# Patient Record
Sex: FEMALE | Race: WHITE | NOT HISPANIC OR LATINO | Employment: UNEMPLOYED | URBAN - METROPOLITAN AREA
[De-identification: names, ages, dates, MRNs, and addresses within clinical notes are randomized per-mention and may not be internally consistent; named-entity substitution may affect disease eponyms.]

---

## 2021-08-19 ENCOUNTER — APPOINTMENT (OUTPATIENT)
Dept: CT IMAGING | Facility: CLINIC | Age: 41
End: 2021-08-19
Attending: EMERGENCY MEDICINE
Payer: COMMERCIAL

## 2021-08-19 ENCOUNTER — APPOINTMENT (OUTPATIENT)
Dept: ULTRASOUND IMAGING | Facility: CLINIC | Age: 41
End: 2021-08-19
Attending: EMERGENCY MEDICINE
Payer: COMMERCIAL

## 2021-08-19 ENCOUNTER — APPOINTMENT (OUTPATIENT)
Dept: RADIOLOGY | Facility: CLINIC | Age: 41
End: 2021-08-19
Attending: EMERGENCY MEDICINE
Payer: COMMERCIAL

## 2021-08-19 ENCOUNTER — HOSPITAL ENCOUNTER (EMERGENCY)
Facility: CLINIC | Age: 41
Discharge: HOME | End: 2021-08-20
Attending: EMERGENCY MEDICINE | Admitting: EMERGENCY MEDICINE
Payer: COMMERCIAL

## 2021-08-19 DIAGNOSIS — R07.9 CHEST PAIN, UNSPECIFIED TYPE: ICD-10-CM

## 2021-08-19 DIAGNOSIS — R10.11 RUQ ABDOMINAL PAIN: ICD-10-CM

## 2021-08-19 LAB
ALBUMIN SERPL-MCNC: 3.8 G/DL (ref 3.5–5)
ALBUMIN UR-MCNC: 20 MG/DL
ALP SERPL-CCNC: 77 U/L (ref 45–120)
ALT SERPL W P-5'-P-CCNC: 17 U/L (ref 0–45)
ANION GAP SERPL CALCULATED.3IONS-SCNC: 11 MMOL/L (ref 5–18)
APPEARANCE UR: ABNORMAL
AST SERPL W P-5'-P-CCNC: 15 U/L (ref 0–40)
BASOPHILS # BLD AUTO: 0.1 10E3/UL (ref 0–0.2)
BASOPHILS NFR BLD AUTO: 2 %
BILIRUB DIRECT SERPL-MCNC: <0.1 MG/DL
BILIRUB SERPL-MCNC: 0.2 MG/DL (ref 0–1)
BILIRUB UR QL STRIP: NEGATIVE
BUN SERPL-MCNC: 16 MG/DL (ref 8–22)
CALCIUM SERPL-MCNC: 9.2 MG/DL (ref 8.5–10.5)
CHLORIDE BLD-SCNC: 107 MMOL/L (ref 98–107)
CO2 SERPL-SCNC: 23 MMOL/L (ref 22–31)
COLOR UR AUTO: YELLOW
CREAT SERPL-MCNC: 1.06 MG/DL (ref 0.6–1.1)
EOSINOPHIL # BLD AUTO: 0.2 10E3/UL (ref 0–0.7)
EOSINOPHIL NFR BLD AUTO: 3 %
ERYTHROCYTE [DISTWIDTH] IN BLOOD BY AUTOMATED COUNT: 15.3 % (ref 10–15)
GFR SERPL CREATININE-BSD FRML MDRD: 65 ML/MIN/1.73M2
GLUCOSE BLD-MCNC: 95 MG/DL (ref 70–125)
GLUCOSE UR STRIP-MCNC: NEGATIVE MG/DL
HCT VFR BLD AUTO: 36.6 % (ref 35–47)
HGB BLD-MCNC: 11 G/DL (ref 11.7–15.7)
HGB UR QL STRIP: NEGATIVE
IMM GRANULOCYTES # BLD: 0 10E3/UL
IMM GRANULOCYTES NFR BLD: 0 %
KETONES UR STRIP-MCNC: ABNORMAL MG/DL
LEUKOCYTE ESTERASE UR QL STRIP: ABNORMAL
LIPASE SERPL-CCNC: 46 U/L (ref 0–52)
LYMPHOCYTES # BLD AUTO: 2.1 10E3/UL (ref 0.8–5.3)
LYMPHOCYTES NFR BLD AUTO: 31 %
MAGNESIUM SERPL-MCNC: 2.2 MG/DL (ref 1.8–2.6)
MCH RBC QN AUTO: 25.8 PG (ref 26.5–33)
MCHC RBC AUTO-ENTMCNC: 30.1 G/DL (ref 31.5–36.5)
MCV RBC AUTO: 86 FL (ref 78–100)
MONOCYTES # BLD AUTO: 0.7 10E3/UL (ref 0–1.3)
MONOCYTES NFR BLD AUTO: 10 %
MUCOUS THREADS #/AREA URNS LPF: PRESENT /LPF
NEUTROPHILS # BLD AUTO: 3.7 10E3/UL (ref 1.6–8.3)
NEUTROPHILS NFR BLD AUTO: 54 %
NITRATE UR QL: NEGATIVE
NRBC # BLD AUTO: 0 10E3/UL
NRBC BLD AUTO-RTO: 0 /100
PH UR STRIP: 5.5 [PH] (ref 5–7)
PLATELET # BLD AUTO: 470 10E3/UL (ref 150–450)
POTASSIUM BLD-SCNC: 4.1 MMOL/L (ref 3.5–5)
PROT SERPL-MCNC: 7.2 G/DL (ref 6–8)
RBC # BLD AUTO: 4.27 10E6/UL (ref 3.8–5.2)
RBC URINE: 2 /HPF
SODIUM SERPL-SCNC: 141 MMOL/L (ref 136–145)
SP GR UR STRIP: 1.03 (ref 1–1.03)
SQUAMOUS EPITHELIAL: 8 /HPF
TROPONIN I SERPL-MCNC: <0.01 NG/ML (ref 0–0.29)
UROBILINOGEN UR STRIP-MCNC: <2 MG/DL
WBC # BLD AUTO: 6.9 10E3/UL (ref 4–11)
WBC URINE: 1 /HPF

## 2021-08-19 PROCEDURE — 74177 CT ABD & PELVIS W/CONTRAST: CPT

## 2021-08-19 PROCEDURE — 81001 URINALYSIS AUTO W/SCOPE: CPT | Performed by: EMERGENCY MEDICINE

## 2021-08-19 PROCEDURE — 83690 ASSAY OF LIPASE: CPT | Performed by: EMERGENCY MEDICINE

## 2021-08-19 PROCEDURE — 82248 BILIRUBIN DIRECT: CPT | Performed by: EMERGENCY MEDICINE

## 2021-08-19 PROCEDURE — 80053 COMPREHEN METABOLIC PANEL: CPT | Performed by: FAMILY MEDICINE

## 2021-08-19 PROCEDURE — 99285 EMERGENCY DEPT VISIT HI MDM: CPT | Mod: 25

## 2021-08-19 PROCEDURE — 36415 COLL VENOUS BLD VENIPUNCTURE: CPT | Performed by: FAMILY MEDICINE

## 2021-08-19 PROCEDURE — 93005 ELECTROCARDIOGRAM TRACING: CPT | Performed by: EMERGENCY MEDICINE

## 2021-08-19 PROCEDURE — 83735 ASSAY OF MAGNESIUM: CPT | Performed by: FAMILY MEDICINE

## 2021-08-19 PROCEDURE — 76705 ECHO EXAM OF ABDOMEN: CPT

## 2021-08-19 PROCEDURE — 85025 COMPLETE CBC W/AUTO DIFF WBC: CPT | Performed by: FAMILY MEDICINE

## 2021-08-19 PROCEDURE — 250N000013 HC RX MED GY IP 250 OP 250 PS 637: Performed by: EMERGENCY MEDICINE

## 2021-08-19 PROCEDURE — 84484 ASSAY OF TROPONIN QUANT: CPT | Performed by: FAMILY MEDICINE

## 2021-08-19 PROCEDURE — 71046 X-RAY EXAM CHEST 2 VIEWS: CPT

## 2021-08-19 PROCEDURE — 82310 ASSAY OF CALCIUM: CPT | Performed by: FAMILY MEDICINE

## 2021-08-19 RX ORDER — NITROGLYCERIN 0.4 MG/1
0.4 TABLET SUBLINGUAL EVERY 5 MIN PRN
Status: DISCONTINUED | OUTPATIENT
Start: 2021-08-19 | End: 2021-08-20 | Stop reason: HOSPADM

## 2021-08-19 RX ORDER — IOPAMIDOL 755 MG/ML
100 INJECTION, SOLUTION INTRAVASCULAR ONCE
Status: COMPLETED | OUTPATIENT
Start: 2021-08-20 | End: 2021-08-20

## 2021-08-19 RX ADMIN — NITROGLYCERIN 0.4 MG: 0.4 TABLET SUBLINGUAL at 22:37

## 2021-08-20 VITALS
RESPIRATION RATE: 16 BRPM | WEIGHT: 260 LBS | DIASTOLIC BLOOD PRESSURE: 75 MMHG | HEART RATE: 72 BPM | TEMPERATURE: 98.5 F | BODY MASS INDEX: 46.06 KG/M2 | OXYGEN SATURATION: 100 % | SYSTOLIC BLOOD PRESSURE: 126 MMHG

## 2021-08-20 LAB
ATRIAL RATE - MUSE: 81 BPM
DIASTOLIC BLOOD PRESSURE - MUSE: NORMAL MMHG
INTERPRETATION ECG - MUSE: NORMAL
P AXIS - MUSE: 52 DEGREES
PR INTERVAL - MUSE: 184 MS
QRS DURATION - MUSE: 72 MS
QT - MUSE: 366 MS
QTC - MUSE: 425 MS
R AXIS - MUSE: 56 DEGREES
SYSTOLIC BLOOD PRESSURE - MUSE: NORMAL MMHG
T AXIS - MUSE: 66 DEGREES
TROPONIN I SERPL-MCNC: <0.01 NG/ML (ref 0–0.29)
VENTRICULAR RATE- MUSE: 81 BPM

## 2021-08-20 PROCEDURE — 36415 COLL VENOUS BLD VENIPUNCTURE: CPT | Performed by: EMERGENCY MEDICINE

## 2021-08-20 PROCEDURE — 250N000011 HC RX IP 250 OP 636: Performed by: EMERGENCY MEDICINE

## 2021-08-20 PROCEDURE — 84484 ASSAY OF TROPONIN QUANT: CPT | Performed by: EMERGENCY MEDICINE

## 2021-08-20 RX ADMIN — IOPAMIDOL 100 ML: 755 INJECTION, SOLUTION INTRAVENOUS at 00:00

## 2021-08-20 NOTE — ED PROVIDER NOTES
"EMERGENCY DEPARTMENT ENCOUNTER      NAME: Andrew Orellana  AGE: 41 year old female  YOB: 1980  MRN: [id number]  EVALUATION DATE & TIME: 8/19/2021 10:13 PM    PCP: Janet Marinelli    ED PROVIDER: Jan Meyer D.O.      Chief Complaint   Patient presents with     Chest Pain         HPI    Patient information was obtained from: patient    Use of : N/A       Andrew Orellana is a 41 year old female with a pertinent medical history of GERD, fibromyalgia, obesity, prediabetes, kidney stones, s/p kidney stone removal who presents to this ED by walk in for the evaluation of left sided chest pain. Patient states feeling intermittent left sided chest pain for the last couple of days, but states her pain has increased today and is \"really bad\". She describes this pain like pressure and \"like a stitch that you get on your side\" but on her chest. She also endorses a couple of episodes of lightheadedness today. She also mentions she felt her heart skip a few beats today while she was in the waiting room. When asked if she had nausea, she said she had not been eating recently.    The patient also endorses some right rib pain which started last night.    Patient denies fever, numbness, tingling, headache.      REVIEW OF SYSTEMS  Constitutional:  Denies fever, chills, weight loss or weakness  Eyes:  No pain, discharge, redness  HENT:  Denies sore throat, ear pain, congestion  Respiratory: No SOB, wheeze or cough  Cardiovascular:  Positive for chest pain (left sided). No palpitations  GI:  Denies abdominal pain, nausea, vomiting, diarrhea  : Denies dysuria, hematuria  Musculoskeletal: Positive for right rib pain. Denies any new joint pain, swelling or loss of function.  Skin:  Denies rash, pallor  Neurologic:  Positive for lightheadedness. Denies headache, numbness, tingling, focal weakness or sensory changes  Lymph: Denies swollen nodes    All other systems negative unless noted in HPI.    PAST MEDICAL " HISTORY:  History reviewed. No pertinent past medical history.    PAST SURGICAL HISTORY:  History reviewed. No pertinent surgical history.      CURRENT MEDICATIONS:    Current Facility-Administered Medications   Medication     nitroGLYcerin (NITROSTAT) sublingual tablet 0.4 mg     Current Outpatient Medications   Medication     Cyclobenzaprine HCl (FLEXERIL PO)     LEVOTHYROXINE SODIUM PO     Pantoprazole Sodium (PROTONIX PO)         ALLERGIES:  No Known Allergies    FAMILY HISTORY:  History reviewed. No pertinent family history.    SOCIAL HISTORY:  Social History     Socioeconomic History     Marital status: Single     Spouse name: Not on file     Number of children: Not on file     Years of education: Not on file     Highest education level: Not on file   Occupational History     Not on file   Tobacco Use     Smoking status: Not on file   Substance and Sexual Activity     Alcohol use: Not on file     Drug use: Not on file     Sexual activity: Not on file   Other Topics Concern     Not on file   Social History Narrative     Not on file     Social Determinants of Health     Financial Resource Strain:      Difficulty of Paying Living Expenses:    Food Insecurity:      Worried About Running Out of Food in the Last Year:      Ran Out of Food in the Last Year:    Transportation Needs:      Lack of Transportation (Medical):      Lack of Transportation (Non-Medical):    Physical Activity:      Days of Exercise per Week:      Minutes of Exercise per Session:    Stress:      Feeling of Stress :    Social Connections:      Frequency of Communication with Friends and Family:      Frequency of Social Gatherings with Friends and Family:      Attends [employer] Services:      Active Member of Clubs or Organizations:      Attends Club or Organization Meetings:      Marital Status:    Intimate Partner Violence:      Fear of Current or Ex-Partner:      Emotionally Abused:      Physically Abused:      Sexually Abused:         VITALS:  Patient Vitals for the past 24 hrs:   BP Temp Temp src Pulse Resp SpO2 Weight   08/20/21 0100 130/65 -- -- 71 -- 100 % --   08/20/21 0000 127/69 -- -- 86 -- 100 % --   08/19/21 2257 -- -- -- 70 -- 100 % --   08/19/21 2149 (!) 156/89 99.9  F (37.7  C) -- 76 16 96 % --   08/19/21 1859 138/64 98.5  F (36.9  C) Temporal 83 18 99 % 117.9 kg (260 lb)       PHYSICAL EXAM    VITAL SIGNS: /65   Pulse 71   Temp 99.9  F (37.7  C)   Resp 16   Wt 117.9 kg (260 lb)   SpO2 100%   BMI 46.06 kg/m      General Appearance: Well-appearing, well-nourished, no acute distress  Head:  Normocephalic, without obvious abnormality, atraumatic  Eyes:  PERRL, conjunctiva/corneas clear, EOM's intact,  ENT:  Lips, mucosa, and tongue normal, membranes are moist without pallor  Neck:  Normal ROM, symmetrical, trachea midline    Chest: Left lateral chest wall tenderness. No deformity, no crepitus  Cardio:  Regular rate and rhythm, no murmur, rub or gallop, 2+ pulses symmetric in all extremities  Pulm:  Clear to auscultation bilaterally, respirations unlabored,  Abdomen:  RUQ tenderness. Soft, no rebound or guarding.  Musculoskeletal: Full ROM, no edema, no cyanosis, good ROM of major joints  Integument:  Warm, Dry, No erythema, No rash.    Neurologic:  Alert & oriented.  No focal deficits appreciated.  Ambulatory.  Psychiatric:  Affect normal, Judgment normal, Mood normal.      LABS  Results for orders placed or performed during the hospital encounter of 08/19/21 (from the past 24 hour(s))   XR Chest 2 Views    Narrative    EXAM: XR CHEST 2 VW  LOCATION: [facility]  DATE/TIME: 8/19/2021 9:17 PM    INDICATION: Chest pain  COMPARISON: None.      Impression    IMPRESSION: Negative chest.   UA with Microscopic reflex to Culture    Specimen: Urine, Clean Catch   Result Value Ref Range    Color Urine Yellow Colorless, Straw, Light Yellow, Yellow    Appearance Urine Turbid (A) Clear    Glucose Urine  Negative Negative mg/dL    Bilirubin Urine Negative Negative    Ketones Urine Trace (A) Negative mg/dL    Specific Gravity Urine 1.035 (H) 1.001 - 1.030    Blood Urine Negative Negative    pH Urine 5.5 5.0 - 7.0    Protein Albumin Urine 20  (A) Negative mg/dL    Urobilinogen Urine <2.0 <2.0 mg/dL    Nitrite Urine Negative Negative    Leukocyte Esterase Urine 75 Jannie/uL (A) Negative    Mucus Urine Present (A) None Seen /LPF    RBC Urine 2 <=2 /HPF    WBC Urine 1 <=5 /HPF    Squamous Epithelials Urine 8 (H) <=1 /HPF    Narrative    Urine Culture not indicated   Basic metabolic panel   Result Value Ref Range    Sodium 141 136 - 145 mmol/L    Potassium 4.1 3.5 - 5.0 mmol/L    Chloride 107 98 - 107 mmol/L    Carbon Dioxide (CO2) 23 22 - 31 mmol/L    Anion Gap 11 5 - 18 mmol/L    Urea Nitrogen 16 8 - 22 mg/dL    Creatinine 1.06 0.60 - 1.10 mg/dL    Calcium 9.2 8.5 - 10.5 mg/dL    Glucose 95 70 - 125 mg/dL    GFR Estimate 65 >60 mL/min/1.73m2   CBC with platelets differential    Narrative    The following orders were created for panel order CBC with platelets differential.  Procedure                               Abnormality         Status                     ---------                               -----------         ------                     CBC with platelets and d...[[id number]]  Abnormal            Final result                 Please view results for these tests on the individual orders.   Troponin I   Result Value Ref Range    Troponin I <0.01 0.00 - 0.29 ng/mL   Magnesium   Result Value Ref Range    Magnesium 2.2 1.8 - 2.6 mg/dL   Hepatic function panel   Result Value Ref Range    Bilirubin Total 0.2 0.0 - 1.0 mg/dL    Bilirubin Direct <0.1 <=0.5 mg/dL    Protein Total 7.2 6.0 - 8.0 g/dL    Albumin 3.8 3.5 - 5.0 g/dL    Alkaline Phosphatase 77 45 - 120 U/L    AST 15 0 - 40 U/L    ALT 17 0 - 45 U/L   Lipase   Result Value Ref Range    Lipase 46 0 - 52 U/L   CBC with platelets and differential   Result Value Ref Range     WBC Count 6.9 4.0 - 11.0 10e3/uL    RBC Count 4.27 3.80 - 5.20 10e6/uL    Hemoglobin 11.0 (L) 11.7 - 15.7 g/dL    Hematocrit 36.6 35.0 - 47.0 %    MCV 86 78 - 100 fL    MCH 25.8 (L) 26.5 - 33.0 pg    MCHC 30.1 (L) 31.5 - 36.5 g/dL    RDW 15.3 (H) 10.0 - 15.0 %    Platelet Count 470 (H) 150 - 450 10e3/uL    % Neutrophils 54 %    % Lymphocytes 31 %    % Monocytes 10 %    % Eosinophils 3 %    % Basophils 2 %    % Immature Granulocytes 0 %    NRBCs per 100 WBC 0 <1 /100    Absolute Neutrophils 3.7 1.6 - 8.3 10e3/uL    Absolute Lymphocytes 2.1 0.8 - 5.3 10e3/uL    Absolute Monocytes 0.7 0.0 - 1.3 10e3/uL    Absolute Eosinophils 0.2 0.0 - 0.7 10e3/uL    Absolute Basophils 0.1 0.0 - 0.2 10e3/uL    Absolute Immature Granulocytes 0.0 <=0.0 10e3/uL    Absolute NRBCs 0.0 10e3/uL   Abdomen US, limited (RUQ only)    Narrative    EXAM: US ABDOMEN LIMITED  LOCATION: [facility]  DATE/TIME: 8/19/2021 10:37 PM    INDICATION: RUQ abdominal pain  COMPARISON: CT 01/20/2021  TECHNIQUE: Limited abdominal ultrasound.    FINDINGS:    GALLBLADDER: Normal. No gallstones, wall thickening, or pericholecystic fluid. Negative sonographic Godinez's sign.    BILE DUCTS: No biliary dilatation. The common duct measures 3 mm.    LIVER: Mild hepatic steatosis. No focal mass.    RIGHT KIDNEY: No hydronephrosis.    PANCREAS: The visualized portions are normal.    No ascites.      Impression    IMPRESSION:  1.  Mild hepatic steatosis.       CT Abdomen Pelvis w Contrast    Narrative    EXAM: CT ABDOMEN PELVIS W CONTRAST  LOCATION: [facility]  DATE/TIME: 8/19/2021 11:59 PM    INDICATION: Upper abdominal pain  COMPARISON: 01/20/2021  TECHNIQUE: CT scan of the abdomen and pelvis was performed following injection of IV contrast. Multiplanar reformats were obtained. Dose reduction techniques were used.  CONTRAST: Isovue-370 100mL    FINDINGS:   LOWER CHEST: Bibasilar fibroatelectasis.    HEPATOBILIARY:  Normal.    PANCREAS: Normal.    SPLEEN: Normal.    ADRENAL GLANDS: Normal.    KIDNEYS/BLADDER: Subcentimeter renal hypodensities small for characterization.    BOWEL: Normal caliber. Normal appendix    LYMPH NODES: Normal.    VASCULATURE: Unremarkable.    PELVIC ORGANS: Normal.    MUSCULOSKELETAL: Stable. Tiny fat-containing umbilical hernia.      Impression    IMPRESSION:   1.  No inflammatory change, bowel obstruction or abscess.   Troponin I (now)   Result Value Ref Range    Troponin I <0.01 0.00 - 0.29 ng/mL         RADIOLOGY  CT Abdomen Pelvis w Contrast   Final Result   IMPRESSION:    1.  No inflammatory change, bowel obstruction or abscess.      Abdomen US, limited (RUQ only)   Final Result   IMPRESSION:   1.  Mild hepatic steatosis.            XR Chest 2 Views   Final Result   IMPRESSION: Negative chest.             EKG:    Rate:81bpm  Rhythm: Normal Sinus Rhythm  Axis: Normal  Interval: Normal  Conduction: Normal  QRS: Normal  ST: Normal  T-wave: Normal  QT: Not prolonged  Comparison EKG: No significant change compared to 25 Aug 2010  Impression:  No acute ischemic change   I have independently reviewed and interpreted today's EKG, pending Cardiologist read      FINAL IMPRESSION:  1. RUQ abdominal pain    2. Chest pain, unspecified type          ED COURSE & MEDICAL DECISION MAKING:    10:31 PM I met with the patient to gather history and to perform my initial exam. I discussed the plan for care while in the Emergency Department. PPE worn including N95 mask, surgical gloves.  1:39 AM I rechecked and updated the patient.    Pertinent Labs & Imaging studies reviewed. (See chart for details)  41 year old female presents to the Emergency Department for evaluation of chest pain.  Pain was somewhat reproducible on exam on the left.  Additionally on exam I did discover she had right upper quadrant tenderness.  The ultrasound and CT scan did not show any evidence of acute pathology that could easily explain her  abdominal symptoms.  Chest x-ray was also unremarkable.  EKG did not show any evidence of ischemia or arrhythmia, 2 troponins were both negative.  There is no clinical concern for PE, dissection, pneumothorax.  She did have some relief with nitro in the emergency department, therefore I still do believe that she does have enough risk factors to benefit follow-up with rapid access clinic.  Patient was agreeable with this plan.  Return precautions were discussed.    At the conclusion of the encounter I discussed the results of all of the tests and the disposition. The questions were answered. The patient or family acknowledged understanding and was agreeable with the care plan.      MEDICATIONS GIVEN IN THE EMERGENCY:  Medications   nitroGLYcerin (NITROSTAT) sublingual tablet 0.4 mg (0.4 mg Sublingual Given 8/19/21 2237)   iopamidol (ISOVUE-370) solution 100 mL (100 mLs Intravenous Given 8/20/21 0000)       NEW PRESCRIPTIONS STARTED AT TODAY'S ER VISIT  New Prescriptions    No medications on file        I, Willard Peña, am serving as a scribe to document services personally performed by Jan Meyer D.O., based on my observations and the provider's statements to me.  I, Jan Meyer D.O., attest that Willard Peña is acting in a scribe capacity, has observed my performance of the services and has documented them in accordance with my direction.     Jan Meyer D.O.  Emergency Medicine  [facility] EMERGENCY ROOM  [address] [address]  [phone]  Dept: [phone]     Jan Meyer,   08/20/21 0144

## 2021-08-20 NOTE — ED NOTES
IV insert attempted x2, unsuccessful. Second RN in to attempt. Medicated with nitroglycerin x1. Reports increase in chest pressure following nitroglycerin. Provider updated, no repeat doses.

## 2021-08-20 NOTE — ED TRIAGE NOTES
Patient arrives with c/o chest pain x 3-4 days on and off.   Reports the pain has not subsided today.   Patient rates pain as 8/10.   Patient takes a anti-HTN med at night.    No previous heart history reported.    5/15/2021 had covid

## 2021-09-16 DIAGNOSIS — Z84.89 FAMILY HISTORY OF GENETIC DISEASE: Primary | ICD-10-CM

## 2021-09-16 NOTE — PROGRESS NOTES
"Reason for Call  Called Andrew to discuss parental testing for variants of uncertain significance that were identified in her child.    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  Testing will look for the variants of uncertain significance alone.    This testing could lead to a diagnosis for Andrew as well if he was found to have a autosomal dominant variant that is later upgraded to pathogenic. If this is expected to impact Andrew's health, we will make appropriate referrals.     HUEY reviewed today.    We reviewed sample collection.    Testing is no charge    Andsade consented to parental testing and declined further questions.  Consent form was signed with her verbal permission.    Medical History  5'3.25\". Asthma, ADHD, depression, anxiety, fibromyalgia with multiple downstream health concerns, hording disorder, hypothyroidism, granuloma annulare, scoliosis (mild to moderate, not requiring surgery), vertigo      Plan  1. Ordered targeted testing for variants of uncertain significance above and other pathogenic variants within these same genes at [facility]  Orders Placed This Encounter   Procedures     Other Laboratory; [facility]; Known Familial Copy Number Variant Genetic Testing via targeted array (905) DO NOT BILL PATIENT (Laboratory Miscellaneous Order)        2. Buccal swab sent to home for sample collection.  3. Pending receipt of sample, results will be returned in approximately 3 weeks and I will call Andrew at that time  4. No further questions.  Contact information provided    Tiara Hogue [facility]  Genetic Counselor   [facility]   Phone: [phone]           "

## 2021-09-28 ENCOUNTER — TRANSCRIBE ORDERS (OUTPATIENT)
Dept: OTHER | Age: 41
End: 2021-09-28

## 2021-09-28 DIAGNOSIS — Z84.89 FAMILY HISTORY OF GENETIC DISEASE: Primary | ICD-10-CM

## 2021-10-06 ENCOUNTER — TELEPHONE (OUTPATIENT)
Dept: CONSULT | Facility: CLINIC | Age: 41
End: 2021-10-06

## 2021-10-06 NOTE — TELEPHONE ENCOUNTER
Spoke with patient regarding Genetics referral from Dr. Maria Dolores Cota. Patient recently had familial genetic testing initiated due to her son's chromosome deletion, but states that this referral is to have more testing done. Patient states her mom passed away from Lewy Body dementia, and she also has some heart problems, as well as a new diagnosis of hypermobility.     Asked patient to contact referring provider's office and have records faxed to [facility] , [place]. Will review records to determine most appropriate next step re: scheduling.

## 2021-10-07 ENCOUNTER — TELEPHONE (OUTPATIENT)
Dept: CONSULT | Facility: CLINIC | Age: 41
End: 2021-10-07

## 2021-10-07 NOTE — TELEPHONE ENCOUNTER
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 She will send back in the next day or two. I will call her with results ~3 weeks thereafter.    Tiara Hogue, [facility]  Genetic Counselor   [facility]   Phone: [phone]

## 2021-10-08 ENCOUNTER — TELEPHONE (OUTPATIENT)
Dept: NEUROLOGY | Facility: CLINIC | Age: 41
End: 2021-10-08

## 2021-10-08 NOTE — TELEPHONE ENCOUNTER
GENETIC COUNSELING-Neurology  I have attempted to contact Andrew Orellana without success. She was referred to general genetics for genetic consult for connective tissue disorder and family history of Lewy Body disease. I had indicated to the referral team that I can see patients for Lewy Body dementia, but not connective tissue testing.  I have attempted to reach Andrew at the only number listed in the chart but there is no answer and the mailbox is full and I cannot leave a message.    Nils Osullvian MS, [facility]  Certified Genetic Counselor

## 2021-10-11 ENCOUNTER — TELEPHONE (OUTPATIENT)
Dept: LAB | Facility: CLINIC | Age: 41
End: 2021-10-11

## 2021-10-11 NOTE — PROGRESS NOTES
GENETIC COUNSELING-Neurology  I made another attempt to reach Andraea about scheduling a genetic consult- but her voicemail box is full and there is no answer to my phone calls. She had been trying to schedule a genetic consult for family history of Lewy body dementia and I have made multiple attempts to reach her at the number listed in epic but I cannot reach her or leave a message.    Nils Osullivan MS, [facility]  Certified Genetic Counselor

## 2021-10-11 NOTE — PROGRESS NOTES
GENETIC COUNSELING-Neurology  I spoke with Andrew Orellana about scheduling an appointment. She was referred for genetic testing for Lewy body dementia in her family history.  She also indicated that she has questions about migraines, fibromylagia, and connective tissue disorder. I indicated that I can specifically consult about her family history of Lewy Body dementia and discuss genetic testing options for that. In order to do any genetic testing, she would also have to see a neurologist.  She asked whether the issues of fibromyalgia/migraines would be addressed and I indicated that the specific issue to be addressed in this consult is the family history of Lewy Body dementia and Parkinson disease. She did indicate that she has another neurologist in the [facility] system.    Appointment was scheduled for 11/2 at 9:30 with myself and Dr. Baumann at 10:10    Nils Osullivan MS, [facility]  Certified Genetic Counselor

## 2021-10-20 PROBLEM — G47.33 OSA (OBSTRUCTIVE SLEEP APNEA): Status: ACTIVE | Noted: 2021-07-14

## 2021-10-20 PROBLEM — G89.29 CHRONIC RIGHT SHOULDER PAIN: Status: ACTIVE | Noted: 2019-10-27

## 2021-10-20 PROBLEM — M47.812 CERVICAL FACET JOINT SYNDROME: Status: ACTIVE | Noted: 2021-08-25

## 2021-10-20 PROBLEM — Q27.8: Status: ACTIVE | Noted: 2021-05-11

## 2021-10-20 PROBLEM — N20.0 NEPHROLITHIASIS: Status: ACTIVE | Noted: 2021-10-20

## 2021-10-20 PROBLEM — B96.89 CHRONIC BACTERIAL SKIN INFECTION: Status: ACTIVE | Noted: 2021-06-07

## 2021-10-20 PROBLEM — Z86.16 HISTORY OF COVID-19: Status: ACTIVE | Noted: 2021-09-08

## 2021-10-20 PROBLEM — D50.9 ANEMIA, IRON DEFICIENCY: Status: ACTIVE | Noted: 2021-10-20

## 2021-10-20 PROBLEM — G56.03 BILATERAL CARPAL TUNNEL SYNDROME: Status: ACTIVE | Noted: 2018-11-04

## 2021-10-20 PROBLEM — L08.89 CHRONIC BACTERIAL SKIN INFECTION: Status: ACTIVE | Noted: 2021-06-07

## 2021-10-20 PROBLEM — G44.209 TENSION HEADACHE: Status: ACTIVE | Noted: 2021-03-10

## 2021-10-20 PROBLEM — M75.111 INCOMPLETE TEAR OF RIGHT ROTATOR CUFF: Status: ACTIVE | Noted: 2019-10-27

## 2021-10-20 PROBLEM — M25.511 CHRONIC RIGHT SHOULDER PAIN: Status: ACTIVE | Noted: 2019-10-27

## 2021-10-20 PROBLEM — M35.7 HYPERMOBILITY SYNDROME: Status: ACTIVE | Noted: 2021-04-21

## 2021-10-20 PROBLEM — R06.83 SNORING: Status: ACTIVE | Noted: 2021-04-21

## 2021-10-20 PROBLEM — M54.2 CERVICAL PAIN (NECK): Status: ACTIVE | Noted: 2021-05-13

## 2021-10-20 PROBLEM — R87.619 ABNORMAL PAP SMEAR OF CERVIX: Status: ACTIVE | Noted: 2019-11-23

## 2021-10-20 PROBLEM — R73.03 PRE-DIABETES: Status: ACTIVE | Noted: 2021-10-20

## 2021-10-20 RX ORDER — DULOXETIN HYDROCHLORIDE 60 MG/1
CAPSULE, DELAYED RELEASE ORAL
COMMUNITY
Start: 2020-02-10

## 2021-10-20 RX ORDER — DICYCLOMINE HYDROCHLORIDE 10 MG/1
CAPSULE ORAL
COMMUNITY
Start: 2021-05-28

## 2021-10-20 RX ORDER — CALCIUM POLYCARBOPHIL 625 MG/1
TABLET, FILM COATED ORAL
COMMUNITY

## 2021-10-20 RX ORDER — PREGABALIN 75 MG/1
CAPSULE ORAL
COMMUNITY
Start: 2021-01-05 | End: 2021-11-02

## 2021-10-20 RX ORDER — LEVOTHYROXINE SODIUM 137 UG/1
TABLET ORAL
COMMUNITY
Start: 2020-12-18

## 2021-10-20 RX ORDER — PREGABALIN 100 MG/1
CAPSULE ORAL
COMMUNITY
Start: 2021-07-06 | End: 2021-11-02

## 2021-10-20 RX ORDER — IPRATROPIUM BROMIDE AND ALBUTEROL SULFATE 2.5; .5 MG/3ML; MG/3ML
3 SOLUTION RESPIRATORY (INHALATION) EVERY 6 HOURS PRN
COMMUNITY
Start: 2020-03-13

## 2021-10-20 RX ORDER — PANTOPRAZOLE SODIUM 40 MG/1
40 TABLET, DELAYED RELEASE ORAL
COMMUNITY
Start: 2021-07-06 | End: 2021-10-27

## 2021-10-20 RX ORDER — TAMSULOSIN HYDROCHLORIDE 0.4 MG/1
0.4 CAPSULE ORAL
COMMUNITY
Start: 2020-01-23 | End: 2021-11-02

## 2021-10-20 RX ORDER — OXYCODONE AND ACETAMINOPHEN 5; 325 MG/1; MG/1
.5-1 TABLET ORAL DAILY PRN
COMMUNITY
Start: 2020-01-23 | End: 2021-11-02

## 2021-10-20 RX ORDER — MOMETASONE FUROATE AND FORMOTEROL FUMARATE DIHYDRATE 200; 5 UG/1; UG/1
2 AEROSOL RESPIRATORY (INHALATION) 2 TIMES DAILY
COMMUNITY
Start: 2021-08-23 | End: 2022-08-23

## 2021-10-20 RX ORDER — PROPRANOLOL HCL 60 MG
CAPSULE, EXTENDED RELEASE 24HR ORAL
COMMUNITY
Start: 2021-03-10

## 2021-10-20 RX ORDER — ALBUTEROL SULFATE 90 UG/1
AEROSOL, METERED RESPIRATORY (INHALATION)
COMMUNITY
Start: 2020-07-08

## 2021-10-20 RX ORDER — LACTIC ACID, L-, CITRIC ACID MONOHYDRATE, AND POTASSIUM BITARTRATE 90; 50; 20 MG/5G; MG/5G; MG/5G
1 GEL VAGINAL
COMMUNITY
Start: 2021-09-24 | End: 2021-11-02

## 2021-10-20 RX ORDER — FAMOTIDINE 20 MG/1
TABLET, FILM COATED ORAL
COMMUNITY
Start: 2021-09-08

## 2021-10-20 RX ORDER — PANTOPRAZOLE SODIUM 40 MG/1
40 TABLET, DELAYED RELEASE ORAL 2 TIMES DAILY
COMMUNITY
Start: 2021-10-05

## 2021-10-20 RX ORDER — WITCH HAZEL 0.5 MG/MG
SOLUTION RECTAL; TOPICAL
COMMUNITY
Start: 2021-04-19

## 2021-10-20 RX ORDER — ONDANSETRON 4 MG/1
4 TABLET, ORALLY DISINTEGRATING ORAL
COMMUNITY
Start: 2020-01-23 | End: 2021-11-02

## 2021-10-20 RX ORDER — IBUPROFEN 200 MG
TABLET ORAL
COMMUNITY
Start: 2021-02-20 | End: 2021-11-02

## 2021-10-20 RX ORDER — CARBOXYMETHYLCELLULOSE SODIUM 5 MG/ML
1 SOLUTION/ DROPS OPHTHALMIC
COMMUNITY
Start: 2021-02-15

## 2021-10-20 RX ORDER — CALCIUM CARBONATE 500(1250)
500 TABLET,CHEWABLE ORAL
COMMUNITY
End: 2021-10-27

## 2021-10-20 RX ORDER — CETIRIZINE HYDROCHLORIDE 10 MG/1
TABLET ORAL
COMMUNITY
Start: 2021-03-12 | End: 2021-10-27

## 2021-10-20 RX ORDER — DEXAMETHASONE 4 MG/1
TABLET ORAL
COMMUNITY
Start: 2021-06-07 | End: 2021-11-02

## 2021-10-20 RX ORDER — FERROUS SULFATE 325(65) MG
325 TABLET ORAL
COMMUNITY
Start: 2021-08-23 | End: 2022-08-23

## 2021-10-20 RX ORDER — OXYCODONE AND ACETAMINOPHEN 5; 325 MG/1; MG/1
TABLET ORAL
COMMUNITY
Start: 2021-06-11 | End: 2021-10-27

## 2021-10-20 RX ORDER — CALCIUM POLYCARBOPHIL 625 MG
625 TABLET ORAL
COMMUNITY
Start: 2020-12-29 | End: 2021-11-02

## 2021-10-20 RX ORDER — CYCLOBENZAPRINE HCL 5 MG
5 TABLET ORAL 2 TIMES DAILY PRN
COMMUNITY
Start: 2020-02-10

## 2021-10-20 RX ORDER — MONTELUKAST SODIUM 10 MG/1
10 TABLET ORAL EVERY EVENING
COMMUNITY
Start: 2021-06-15 | End: 2022-06-15

## 2021-10-20 RX ORDER — TOPIRAMATE 25 MG/1
TABLET, FILM COATED ORAL
COMMUNITY
Start: 2021-03-05 | End: 2021-11-02

## 2021-10-20 RX ORDER — LISDEXAMFETAMINE DIMESYLATE 20 MG/1
20 CAPSULE ORAL
COMMUNITY
Start: 2020-02-10 | End: 2021-11-02

## 2021-10-20 RX ORDER — TRIAMCINOLONE ACETONIDE 1 MG/G
OINTMENT TOPICAL
COMMUNITY
Start: 2019-11-08

## 2021-10-20 RX ORDER — METRONIDAZOLE 7.5 MG/G
GEL VAGINAL
COMMUNITY
Start: 2021-03-30 | End: 2021-11-02

## 2021-10-20 RX ORDER — RIMEGEPANT SULFATE 75 MG/75MG
TABLET, ORALLY DISINTEGRATING ORAL
COMMUNITY
Start: 2021-06-07

## 2021-10-20 RX ORDER — PREGABALIN 200 MG/1
200 CAPSULE ORAL
COMMUNITY
Start: 2021-09-03 | End: 2022-09-03

## 2021-10-20 RX ORDER — DULOXETIN HYDROCHLORIDE 30 MG/1
CAPSULE, DELAYED RELEASE ORAL
COMMUNITY
Start: 2020-02-10

## 2021-10-20 RX ORDER — MAGNESIUM CARB/ALUMINUM HYDROX 105-160MG
TABLET,CHEWABLE ORAL
COMMUNITY
Start: 2021-04-09 | End: 2021-11-02

## 2021-10-20 RX ORDER — FLUTICASONE PROPIONATE 50 MCG
1-2 SPRAY, SUSPENSION (ML) NASAL DAILY
COMMUNITY
Start: 2020-04-17

## 2021-10-20 RX ORDER — CETIRIZINE HYDROCHLORIDE 10 MG/1
TABLET ORAL
COMMUNITY
Start: 2021-10-05

## 2021-10-20 RX ORDER — POLYETHYLENE GLYCOL 3350 17 G/17G
POWDER, FOR SOLUTION ORAL
COMMUNITY
Start: 2021-04-08

## 2021-10-20 RX ORDER — CELECOXIB 100 MG/1
200 CAPSULE ORAL 2 TIMES DAILY
COMMUNITY
Start: 2021-09-03

## 2021-10-20 RX ORDER — BUSPIRONE HYDROCHLORIDE 30 MG/1
30 TABLET ORAL 2 TIMES DAILY
COMMUNITY
Start: 2020-02-10

## 2021-10-20 RX ORDER — PREGABALIN 150 MG/1
CAPSULE ORAL
COMMUNITY
Start: 2021-08-20 | End: 2021-11-02

## 2021-10-20 RX ORDER — CALCIUM CARBONATE 500(1250)
500 TABLET,CHEWABLE ORAL
COMMUNITY

## 2021-10-20 RX ORDER — LORAZEPAM 1 MG/1
TABLET ORAL
COMMUNITY
Start: 2020-11-12

## 2021-10-20 RX ORDER — RIMEGEPANT SULFATE 75 MG/75MG
TABLET, ORALLY DISINTEGRATING ORAL
COMMUNITY
Start: 2021-03-10 | End: 2021-11-02

## 2021-10-20 RX ORDER — LIDOCAINE 50 MG/G
1 PATCH TOPICAL
COMMUNITY
Start: 2019-11-08

## 2021-10-27 PROBLEM — M54.2 CHRONIC NECK PAIN: Status: ACTIVE | Noted: 2021-05-13

## 2021-10-27 PROBLEM — B01.9 VARICELLA: Status: ACTIVE | Noted: 2021-10-27

## 2021-10-27 PROBLEM — I77.9 CAROTID DISEASE, BILATERAL (H): Status: ACTIVE | Noted: 2021-10-27

## 2021-10-27 PROBLEM — Z82.0 FAMILY HISTORY OF NEUROLOGIC DISORDER: Status: ACTIVE | Noted: 2021-10-27

## 2021-10-27 PROBLEM — U07.1 COVID-19: Status: ACTIVE | Noted: 2021-05-01

## 2021-10-27 PROBLEM — G89.29 CHRONIC NECK PAIN: Status: ACTIVE | Noted: 2021-05-13

## 2021-10-27 PROBLEM — G47.33 OSA ON CPAP: Status: ACTIVE | Noted: 2021-10-27
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
## 2021-10-30 ENCOUNTER — HEALTH MAINTENANCE LETTER (OUTPATIENT)
Age: 41
End: 2021-10-30

## 2021-11-02 ENCOUNTER — VIRTUAL VISIT (OUTPATIENT)
Dept: NEUROLOGY | Facility: CLINIC | Age: 41
End: 2021-11-02
Payer: COMMERCIAL

## 2021-11-02 DIAGNOSIS — Z84.89 FAMILY HISTORY OF GENETIC DISEASE: ICD-10-CM

## 2021-11-02 DIAGNOSIS — Z82.0 FAMILY HISTORY OF NEUROLOGIC DISORDER: ICD-10-CM

## 2021-11-02 PROCEDURE — 99205 OFFICE O/P NEW HI 60 MIN: CPT | Mod: 95 | Performed by: PSYCHIATRY & NEUROLOGY

## 2021-11-02 PROCEDURE — 99207 PR NO CHARGE LOS: CPT | Performed by: GENETIC COUNSELOR, MS

## 2021-11-02 PROCEDURE — 96040 PR GENETIC COUNSELING, EACH 30 MIN: CPT | Mod: 95 | Performed by: GENETIC COUNSELOR, MS

## 2021-11-02 RX ORDER — LISDEXAMFETAMINE DIMESYLATE 20 MG/1
20 CAPSULE ORAL EVERY MORNING
COMMUNITY
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
## 2021-11-02 NOTE — PROGRESS NOTES
Andrew is a 41 year old who is being evaluated via a billable video visit.      How would you like to obtain your AVS? MyChart  If the video visit is dropped, the invitation should be resent by: Send to e-mail at: [email or web address]  Will anyone else be joining your video visit? No      Video Start Time: 9:25 AM  Video-Visit Details    Type of service:  Video Visit    Video End Time:10:28 AM    Originating Location (pt. Location): Home    Distant Location (provider location):  [facility] NEUROLOGY CLINIC [place]     Platform used for Video Visit: GroupVox    GENETIC COUNSELING-Neurology  I met with Andrew Orellana for a genetic consult due to her family history of Lewy Body Dementia (LBD). She has also been seen in our pediatrics genetics clinic as part of the workup for her son's short stature and autism- but today's visit was focused on her questions related to her family history of Parkinsonism.    MEDICAL HISTORY-  Andrew indicated that she is followed by an outside neurologist for diagnoses of fibromyalgia and migraines.  She was seen by our movement disorders specialist today, Dr. Regis Baumann. She currently does not believe that she has clinical evidence of Parkinsonism and is presenting to discuss her family history and how it relates to her own risks.    FAMILY HISTORY-see scanned pedigree  1. Andrew's mother  at age 69 from LBD- she was diagnosed and followed at the [facility] and within the [facility] system.  Jsoe is unclear if her mother ever had any genetic testing as part of her workup.  2. Andrew's mother's sister is currently in hospice in advanced stages of Parkinson disease with accompanying dementia.  3. Andrew's mother's father (maternal grandfather)  at age 62 from ALS.  4. No other significant family history of neurologic disease is reported.  5. As mentioned previously, Andrew has a son who is undergoing workup in pediatric genetics due to short stature and  autism. A microarray identified a copy number variant of uncertain significance and Andraea is being tested to determine if this is an inherited polymorphism or potentially pathogenic variant.     GENETIC COUNSELING-  We discussed the genetic and environmental basis of neurologic disease. I explained that in most cases, it results from complex and lifelong interaction of multiple genetic and environmental factors. In some cases, there may be a single gene cause.    I explained that are some rare fully penetrant forms of Parkinsonism. We briefly discussed that these forms of Parkinsonism can be either dominant (e.g. SNCA, GRN and others) or recessive (PRKN and others).  I explained that if this type of Parkinsonism is identified in the family, then there would be very precise and highly predictive testing available to family members. I again stressed that these types of Parkinsonism are not common.    We next discussed more common genetic risk factors for Parkinsonism (e.g. LRRK2 and GBA). I explained that these types of genetic findings are much more common than the above described Mendelian forms. I explained that if such a risk factor is identified, they confer an autosomal dominant increased susceptibility to Parkinsonism, but not a certainty of developing Parkinsonism. Thus, the decision of whether or not to be tested for such risk factors is complex as they do not definitively answer the question of whether a specific individual will develop Parkinsonism.    We briefly discussed the potential significance/relationship of the ALS history in her grandfather. It is possible that this is unrelated to the family history of Parkinsonism. Alternatively, there are some genes that we might consider testing that are not typically part of a 'Parkinsonism panel' (e.g. [id number]) as there can be some genetic overlap between Parkinsonisms and motor neuron disease with these genes.    We also addressed the potential  relationship of her son's workup with the family history of Parkinsonism. I indicated that these are most likely two separate and discrete issues- and that her testing for the identified chromosome 3 copy number variant is not likely to shed light specifically on her own risk for Parkinsonism.  This testing is certainly worth pursuing in terms of clarifying her son's findings. I would defer any interpretation of her results of this testing to the Pediatric genetics team.    We discussed testing strategy. I explained that the ideal testing strategy is to begin with testing of an affected family member.  This would allow for more accurate interpretation of results in other (asymptomatic) family members. If a mutation is identified in an affected family member, we could offer a very targeted test to someone like Andrew who wants to determine whether or not they have inherited the known genetic risk. Alternatively, if testing is performed in an affected family member and no mutation is identified, then testing of Andrew is unlikely to be of any clinical utility.    I did stress the limitations of current genetic testing. While we can identify some of the more significant genetic risks for Parkinsonism, there are likely many variants that have very subtle effects that cannot be evaluated at the present time. Thus, we would say that in the absence of any additional information, we would still consider Andrew to have a higher risk for Parkinsonism based upon her family history. Similarly, if Andrew had a normal genetic test and no other family members had testing, we would be very hesitant to say that the normal test absolves her of the familial risk for Parkinsonism.  The only truly 'negative' or 'normal' genetic testing scenario would be if we were able to identify a genetic risk in a family member and then exclude it in [place].    We discussed some potential strategies for moving forward. It would be helpful to  determine if her mother had any genetic testing as part of her workup at [place].  [place] is very involved with research and clinical testing for Parkinsonism, and so it is possible that some of this testing may have been done. Alternatively, we discussed having a conversation with her cousins (daughters of the living aunt with Parkinsonism) to see if either genetic testing was done, or if they are interested in trying to pursue this for their mother.  These would be the most fruitful potential paths for identifying a potential genetic risk that would allow for informative [facility]. I    No testing was ordered today. Andrew plans to try to talk with her step-father and her cousins to see if there is any genetic information, or any interest in pursuing genetic testing. I did indicate that I would be happy to help coordinate any genetic testing if there is interest in testing Andrew's aunt.    Nils Osullivan MS, [facility]  Certified Genetic Counselor

## 2021-11-02 NOTE — LETTER
2021       RE: Andrew Orellana  [address]  [address] [place] [id number]     Dear Colleague,    Thank you for referring your patient, Andrew Orellana, to the [facility] NEUROLOGY CLINIC [place] at [facility]. Please see a copy of my visit note below.    Andrew is a 41 year old who is being evaluated via a billable video visit.      How would you like to obtain your AVS? MyChart  If the video visit is dropped, the invitation should be resent by: Send to e-mail at: [email or web address]  Will anyone else be joining your video visit? No      Video Start Time: 9:25 AM  Video-Visit Details    Type of service:  Video Visit    Video End Time:10:28 AM    Originating Location (pt. Location): Home    Distant Location (provider location):  [facility] NEUROLOGY [facility]     Platform used for Video Visit: [employer]    GENETIC COUNSELING-Neurology  I met with Andrew Orellana for a genetic consult due to her family history of Lewy Body Dementia (LBD). She has also been seen in our pediatrics genetics clinic as part of the workup for her son's short stature and autism- but today's visit was focused on her questions related to her family history of Parkinsonism.    MEDICAL HISTORY-  Andrew indicated that she is followed by an outside neurologist for diagnoses of fibromyalgia and migraines.  She was seen by our movement disorders specialist today, Dr. Regis Baumann. She currently does not believe that she has clinical evidence of Parkinsonism and is presenting to discuss her family history and how it relates to her own risks.    FAMILY HISTORY-see scanned pedigree  1. Andrew's mother  at age 69 from LBD- she was diagnosed and followed at the [facility] and within the [facility] system.  Jose is unclear if her mother ever had any genetic testing as part of her workup.  2. Andrew's mother's sister is currently in hospice in advanced stages of  Parkinson disease with accompanying dementia.  3. Andrew's mother's father (maternal grandfather)  at age 62 from ALS.  4. No other significant family history of neurologic disease is reported.  5. As mentioned previously, Andrew has a son who is undergoing workup in pediatric genetics due to short stature and autism. A microarray identified a copy number variant of uncertain significance and Andrew is being tested to determine if this is an inherited polymorphism or potentially pathogenic variant.     GENETIC COUNSELING-  We discussed the genetic and environmental basis of neurologic disease. I explained that in most cases, it results from complex and lifelong interaction of multiple genetic and environmental factors. In some cases, there may be a single gene cause.    I explained that are some rare fully penetrant forms of Parkinsonism. We briefly discussed that these forms of Parkinsonism can be either dominant (e.g. SNCA, GRN and others) or recessive (PRKN and others).  I explained that if this type of Parkinsonism is identified in the family, then there would be very precise and highly predictive testing available to family members. I again stressed that these types of Parkinsonism are not common.    We next discussed more common genetic risk factors for Parkinsonism (e.g. LRRK2 and GBA). I explained that these types of genetic findings are much more common than the above described Mendelian forms. I explained that if such a risk factor is identified, they confer an autosomal dominant increased susceptibility to Parkinsonism, but not a certainty of developing Parkinsonism. Thus, the decision of whether or not to be tested for such risk factors is complex as they do not definitively answer the question of whether a specific individual will develop Parkinsonism.    We briefly discussed the potential significance/relationship of the ALS history in her grandfather. It is possible that this is unrelated to  the family history of Parkinsonism. Alternatively, there are some genes that we might consider testing that are not typically part of a 'Parkinsonism panel' (e.g. [id number]) as there can be some genetic overlap between Parkinsonisms and motor neuron disease with these genes.    We also addressed the potential relationship of her son's workup with the family history of Parkinsonism. I indicated that these are most likely two separate and discrete issues- and that her testing for the identified chromosome 3 copy number variant is not likely to shed light specifically on her own risk for Parkinsonism.  This testing is certainly worth pursuing in terms of clarifying her son's findings. I would defer any interpretation of her results of this testing to the Pediatric genetics team.    We discussed testing strategy. I explained that the ideal testing strategy is to begin with testing of an affected family member.  This would allow for more accurate interpretation of results in other (asymptomatic) family members. If a mutation is identified in an affected family member, we could offer a very targeted test to someone like Andrew who wants to determine whether or not they have inherited the known genetic risk. Alternatively, if testing is performed in an affected family member and no mutation is identified, then testing of Andrew is unlikely to be of any clinical utility.    I did stress the limitations of current genetic testing. While we can identify some of the more significant genetic risks for Parkinsonism, there are likely many variants that have very subtle effects that cannot be evaluated at the present time. Thus, we would say that in the absence of any additional information, we would still consider Andrew to have a higher risk for Parkinsonism based upon her family history. Similarly, if Andrew had a normal genetic test and no other family members had testing, we would be very hesitant to say that the normal  test absolves her of the familial risk for Parkinsonism.  The only truly 'negative' or 'normal' genetic testing scenario would be if we were able to identify a genetic risk in a family member and then exclude it in [facility].    We discussed some potential strategies for moving forward. It would be helpful to determine if her mother had any genetic testing as part of her workup at [place].  [place] is very involved with research and clinical testing for Parkinsonism, and so it is possible that some of this testing may have been done. Alternatively, we discussed having a conversation with her cousins (daughters of the living aunt with Parkinsonism) to see if either genetic testing was done, or if they are interested in trying to pursue this for their mother.  These would be the most fruitful potential paths for identifying a potential genetic risk that would allow for informative testing in [facility]. I    No testing was ordered today. Andrew plans to try to talk with her step-father and her cousins to see if there is any genetic information, or any interest in pursuing genetic testing. I did indicate that I would be happy to help coordinate any genetic testing if there is interest in testing Andrew's aunt.    Nils Osullivan MS, [facility]  Certified Genetic Counselor
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
## 2021-11-23 ENCOUNTER — TELEPHONE (OUTPATIENT)
Dept: CONSULT | Facility: CLINIC | Age: 41
End: 2021-11-23
Payer: COMMERCIAL

## 2021-11-23 NOTE — TELEPHONE ENCOUNTER
Called Andrew to disclose results of her genetic testing. 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 This deletion may be related to her diagnosis of ADHD and concern for autism (evaluation pending). Alternatively, this deletion may have no effect on her health, as ADHD and autism are complex conditions that occur in the absence of copy number variants such as this. Based on what we know about the genes in this region at this time, this deletion would not explain her other health concerns. We discussed that this variant remains classified as uncertain, so it does not establish a diagnosis. We will continue to follow-up on its classification at her son's genetics visits.    See her [facility] for details.     Tiara Hogue, [facility]  Genetic Counselor   [facility]   Phone: [phone]

## 2022-01-10 DIAGNOSIS — Z84.89 FAMILY HISTORY OF GENETIC DISEASE: Primary | ICD-10-CM

## 2022-01-10 NOTE — PROGRESS NOTES
Andrew will be participating as a family member in exome sequencing for her son, Junior ([id number]).  Please refer to their chart for additional details.     Verbal informed consent was obtained on 1/10/2022 after reviewing the risks, benefits, and limitations of participating in exome sequencing as a family member    Andrew consented to receiving secondary findings for themself after reviewing information regarding them.    Ibis Maldonado MS [facility]  Genetic Counselor  Email: [email or web address]  Phone: [phone]  Pager: [phone]

## 2022-01-26 ENCOUNTER — LAB (OUTPATIENT)
Dept: LAB | Facility: CLINIC | Age: 42
End: 2022-01-26
Payer: COMMERCIAL

## 2022-01-26 DIAGNOSIS — Z84.89 FAMILY HISTORY OF GENETIC DISEASE: ICD-10-CM

## 2022-01-26 PROCEDURE — 99000 SPECIMEN HANDLING OFFICE-LAB: CPT

## 2022-01-26 PROCEDURE — 36415 COLL VENOUS BLD VENIPUNCTURE: CPT

## 2022-02-25 LAB
Lab: NORMAL
PERFORMING LABORATORY: NORMAL
SCANNED LAB RESULT: NORMAL
TEST NAME: NORMAL

## 2022-10-22 ENCOUNTER — HEALTH MAINTENANCE LETTER (OUTPATIENT)
Age: 42
End: 2022-10-22

## 2022-12-10 ENCOUNTER — HEALTH MAINTENANCE LETTER (OUTPATIENT)
Age: 42
End: 2022-12-10

## 2024-01-14 ENCOUNTER — HEALTH MAINTENANCE LETTER (OUTPATIENT)
Age: 44
End: 2024-01-14

## 2024-03-24 ENCOUNTER — HEALTH MAINTENANCE LETTER (OUTPATIENT)
Age: 44
End: 2024-03-24

## 2025-01-26 ENCOUNTER — HEALTH MAINTENANCE LETTER (OUTPATIENT)
Age: 45
End: 2025-01-26